# Patient Record
Sex: MALE | Race: WHITE | NOT HISPANIC OR LATINO
[De-identification: names, ages, dates, MRNs, and addresses within clinical notes are randomized per-mention and may not be internally consistent; named-entity substitution may affect disease eponyms.]

---

## 2019-06-14 PROBLEM — Z00.00 ENCOUNTER FOR PREVENTIVE HEALTH EXAMINATION: Status: ACTIVE | Noted: 2019-06-14

## 2019-06-20 ENCOUNTER — APPOINTMENT (OUTPATIENT)
Dept: OTOLARYNGOLOGY | Facility: CLINIC | Age: 74
End: 2019-06-20
Payer: MEDICARE

## 2019-06-20 VITALS — HEIGHT: 69 IN | BODY MASS INDEX: 25.92 KG/M2 | WEIGHT: 175 LBS

## 2019-06-20 DIAGNOSIS — Z87.891 PERSONAL HISTORY OF NICOTINE DEPENDENCE: ICD-10-CM

## 2019-06-20 DIAGNOSIS — Z87.09 PERSONAL HISTORY OF OTHER DISEASES OF THE RESPIRATORY SYSTEM: ICD-10-CM

## 2019-06-20 DIAGNOSIS — Z82.5 FAMILY HISTORY OF ASTHMA AND OTHER CHRONIC LOWER RESPIRATORY DISEASES: ICD-10-CM

## 2019-06-20 DIAGNOSIS — H90.42 SENSORINEURAL HEARING LOSS, UNILATERAL, LEFT EAR, WITH UNRESTRICTED HEARING ON THE CONTRALATERAL SIDE: ICD-10-CM

## 2019-06-20 DIAGNOSIS — H61.23 IMPACTED CERUMEN, BILATERAL: ICD-10-CM

## 2019-06-20 DIAGNOSIS — Z78.9 OTHER SPECIFIED HEALTH STATUS: ICD-10-CM

## 2019-06-20 DIAGNOSIS — Z85.07 PERSONAL HISTORY OF MALIGNANT NEOPLASM OF PANCREAS: ICD-10-CM

## 2019-06-20 DIAGNOSIS — Z87.01 PERSONAL HISTORY OF PNEUMONIA (RECURRENT): ICD-10-CM

## 2019-06-20 DIAGNOSIS — H93.293 OTHER ABNORMAL AUDITORY PERCEPTIONS, BILATERAL: ICD-10-CM

## 2019-06-20 PROCEDURE — 92567 TYMPANOMETRY: CPT

## 2019-06-20 PROCEDURE — 99203 OFFICE O/P NEW LOW 30 MIN: CPT | Mod: 25

## 2019-06-20 PROCEDURE — 92557 COMPREHENSIVE HEARING TEST: CPT

## 2019-06-20 PROCEDURE — 69210 REMOVE IMPACTED EAR WAX UNI: CPT

## 2019-06-20 RX ORDER — FAMOTIDINE 40 MG/1
40 TABLET, FILM COATED ORAL
Refills: 0 | Status: ACTIVE | COMMUNITY

## 2019-06-20 RX ORDER — PNV NO.95/FERROUS FUM/FOLIC AC 28MG-0.8MG
TABLET ORAL
Refills: 0 | Status: ACTIVE | COMMUNITY

## 2019-06-20 RX ORDER — MONTELUKAST SODIUM 10 MG/1
10 TABLET, FILM COATED ORAL
Refills: 0 | Status: ACTIVE | COMMUNITY

## 2019-06-20 RX ORDER — MELATONIN 3 MG
TABLET ORAL
Refills: 0 | Status: ACTIVE | COMMUNITY

## 2019-06-20 RX ORDER — MIRTAZAPINE 7.5 MG/1
TABLET, FILM COATED ORAL
Refills: 0 | Status: ACTIVE | COMMUNITY

## 2019-06-20 NOTE — HISTORY OF PRESENT ILLNESS
[de-identified] : DIANA TAYLOR is a 73 year patient Abnormal auditory perception and possible cerumen impaction. He has had that in the past. He denies otalgia, otorrhea, tinnitus, or dizziness. 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 He started cisplatin for pancreatic cancer today.\par   \par History of recurrent ear infections-  no\par Prior ear surgery- no\par History of otologic trauma-  no\par Noise exposure- some from concerts in the past. \par Family history of hearing loss-  no\par Prior audiogram-  2017

## 2019-06-20 NOTE — ASSESSMENT
[FreeTextEntry1] : He had cerumen impaction bilaterally which was removed. He felt better afterwards. Audiogram shows normal hearing with a mild asymmetry at 8000 Hz in the left ear.\par \par PLAN\par \par -findings and management options discussed in detail with the patient. \par -good aural hygiene\par -avoid using cotton swabs in the ears\par -wax removal drops as needed. \par -noise precautions\par -we will monitor his hearing since he is on cisplatin\par -follow up 2-3 months\par -call and return earlier if any concerns. \par